# Patient Record
Sex: MALE | Race: BLACK OR AFRICAN AMERICAN | NOT HISPANIC OR LATINO | URBAN - METROPOLITAN AREA
[De-identification: names, ages, dates, MRNs, and addresses within clinical notes are randomized per-mention and may not be internally consistent; named-entity substitution may affect disease eponyms.]

---

## 2017-07-24 ENCOUNTER — EMERGENCY (EMERGENCY)
Facility: HOSPITAL | Age: 57
LOS: 1 days | Discharge: HOME | End: 2017-07-24
Attending: EMERGENCY MEDICINE | Admitting: EMERGENCY MEDICINE
Payer: COMMERCIAL

## 2017-07-24 VITALS
RESPIRATION RATE: 18 BRPM | TEMPERATURE: 98 F | HEART RATE: 67 BPM | OXYGEN SATURATION: 99 % | DIASTOLIC BLOOD PRESSURE: 93 MMHG | SYSTOLIC BLOOD PRESSURE: 132 MMHG

## 2017-07-24 VITALS
SYSTOLIC BLOOD PRESSURE: 109 MMHG | RESPIRATION RATE: 16 BRPM | DIASTOLIC BLOOD PRESSURE: 71 MMHG | HEART RATE: 52 BPM | TEMPERATURE: 98 F | OXYGEN SATURATION: 99 %

## 2017-07-24 LAB
ALBUMIN SERPL ELPH-MCNC: 4.7 G/DL — SIGNIFICANT CHANGE UP (ref 3.3–5)
ALP SERPL-CCNC: 61 U/L — SIGNIFICANT CHANGE UP (ref 40–120)
ALT FLD-CCNC: 15 U/L RC — SIGNIFICANT CHANGE UP (ref 10–45)
ANION GAP SERPL CALC-SCNC: 13 MMOL/L — SIGNIFICANT CHANGE UP (ref 5–17)
APPEARANCE UR: CLEAR — SIGNIFICANT CHANGE UP
APTT BLD: 30.4 SEC — SIGNIFICANT CHANGE UP (ref 27.5–37.4)
AST SERPL-CCNC: 21 U/L — SIGNIFICANT CHANGE UP (ref 10–40)
BASE EXCESS BLDV CALC-SCNC: 1.4 MMOL/L — SIGNIFICANT CHANGE UP (ref -2–2)
BASOPHILS # BLD AUTO: 0.1 K/UL — SIGNIFICANT CHANGE UP (ref 0–0.2)
BASOPHILS NFR BLD AUTO: 2.1 % — HIGH (ref 0–2)
BILIRUB SERPL-MCNC: 0.7 MG/DL — SIGNIFICANT CHANGE UP (ref 0.2–1.2)
BILIRUB UR-MCNC: NEGATIVE — SIGNIFICANT CHANGE UP
BUN SERPL-MCNC: 17 MG/DL — SIGNIFICANT CHANGE UP (ref 7–23)
CA-I SERPL-SCNC: 1.31 MMOL/L — HIGH (ref 1.12–1.3)
CALCIUM SERPL-MCNC: 9.6 MG/DL — SIGNIFICANT CHANGE UP (ref 8.4–10.5)
CHLORIDE BLDV-SCNC: 104 MMOL/L — SIGNIFICANT CHANGE UP (ref 96–108)
CHLORIDE SERPL-SCNC: 103 MMOL/L — SIGNIFICANT CHANGE UP (ref 96–108)
CO2 BLDV-SCNC: 30 MMOL/L — SIGNIFICANT CHANGE UP (ref 22–30)
CO2 SERPL-SCNC: 25 MMOL/L — SIGNIFICANT CHANGE UP (ref 22–31)
COLOR SPEC: SIGNIFICANT CHANGE UP
CREAT SERPL-MCNC: 1.2 MG/DL — SIGNIFICANT CHANGE UP (ref 0.5–1.3)
DIFF PNL FLD: NEGATIVE — SIGNIFICANT CHANGE UP
EOSINOPHIL # BLD AUTO: 0.2 K/UL — SIGNIFICANT CHANGE UP (ref 0–0.5)
EOSINOPHIL NFR BLD AUTO: 2.7 % — SIGNIFICANT CHANGE UP (ref 0–6)
GAS PNL BLDV: 140 MMOL/L — SIGNIFICANT CHANGE UP (ref 136–145)
GAS PNL BLDV: SIGNIFICANT CHANGE UP
GAS PNL BLDV: SIGNIFICANT CHANGE UP
GLUCOSE BLDV-MCNC: 100 MG/DL — HIGH (ref 70–99)
GLUCOSE SERPL-MCNC: 103 MG/DL — HIGH (ref 70–99)
GLUCOSE UR QL: NEGATIVE — SIGNIFICANT CHANGE UP
HCO3 BLDV-SCNC: 28 MMOL/L — SIGNIFICANT CHANGE UP (ref 21–29)
HCT VFR BLD CALC: 43.4 % — SIGNIFICANT CHANGE UP (ref 39–50)
HCT VFR BLDA CALC: 44 % — SIGNIFICANT CHANGE UP (ref 39–50)
HGB BLD CALC-MCNC: 14.2 G/DL — SIGNIFICANT CHANGE UP (ref 13–17)
HGB BLD-MCNC: 14.8 G/DL — SIGNIFICANT CHANGE UP (ref 13–17)
KETONES UR-MCNC: NEGATIVE — SIGNIFICANT CHANGE UP
LACTATE BLDV-MCNC: 1.5 MMOL/L — SIGNIFICANT CHANGE UP (ref 0.7–2)
LEUKOCYTE ESTERASE UR-ACNC: NEGATIVE — SIGNIFICANT CHANGE UP
LYMPHOCYTES # BLD AUTO: 2.9 K/UL — SIGNIFICANT CHANGE UP (ref 1–3.3)
LYMPHOCYTES # BLD AUTO: 48.3 % — HIGH (ref 13–44)
MCHC RBC-ENTMCNC: 29.4 PG — SIGNIFICANT CHANGE UP (ref 27–34)
MCHC RBC-ENTMCNC: 34.1 GM/DL — SIGNIFICANT CHANGE UP (ref 32–36)
MCV RBC AUTO: 86.3 FL — SIGNIFICANT CHANGE UP (ref 80–100)
MONOCYTES # BLD AUTO: 0.6 K/UL — SIGNIFICANT CHANGE UP (ref 0–0.9)
MONOCYTES NFR BLD AUTO: 10.6 % — SIGNIFICANT CHANGE UP (ref 2–14)
NEUTROPHILS # BLD AUTO: 2.2 K/UL — SIGNIFICANT CHANGE UP (ref 1.8–7.4)
NEUTROPHILS NFR BLD AUTO: 36.3 % — LOW (ref 43–77)
NITRITE UR-MCNC: NEGATIVE — SIGNIFICANT CHANGE UP
PCO2 BLDV: 57 MMHG — HIGH (ref 35–50)
PH BLDV: 7.32 — LOW (ref 7.35–7.45)
PH UR: 6 — SIGNIFICANT CHANGE UP (ref 5–8)
PLATELET # BLD AUTO: 221 K/UL — SIGNIFICANT CHANGE UP (ref 150–400)
PO2 BLDV: 30 MMHG — SIGNIFICANT CHANGE UP (ref 25–45)
POTASSIUM BLDV-SCNC: 4 MMOL/L — SIGNIFICANT CHANGE UP (ref 3.5–5)
POTASSIUM SERPL-MCNC: 4.2 MMOL/L — SIGNIFICANT CHANGE UP (ref 3.5–5.3)
POTASSIUM SERPL-SCNC: 4.2 MMOL/L — SIGNIFICANT CHANGE UP (ref 3.5–5.3)
PROT SERPL-MCNC: 7.8 G/DL — SIGNIFICANT CHANGE UP (ref 6–8.3)
PROT UR-MCNC: SIGNIFICANT CHANGE UP
RBC # BLD: 5.03 M/UL — SIGNIFICANT CHANGE UP (ref 4.2–5.8)
RBC # FLD: 12.6 % — SIGNIFICANT CHANGE UP (ref 10.3–14.5)
SAO2 % BLDV: 38 % — LOW (ref 67–88)
SODIUM SERPL-SCNC: 141 MMOL/L — SIGNIFICANT CHANGE UP (ref 135–145)
SP GR SPEC: 1.02 — SIGNIFICANT CHANGE UP (ref 1.01–1.02)
UROBILINOGEN FLD QL: NEGATIVE — SIGNIFICANT CHANGE UP
WBC # BLD: 6 K/UL — SIGNIFICANT CHANGE UP (ref 3.8–10.5)
WBC # FLD AUTO: 6 K/UL — SIGNIFICANT CHANGE UP (ref 3.8–10.5)

## 2017-07-24 PROCEDURE — 80053 COMPREHEN METABOLIC PANEL: CPT

## 2017-07-24 PROCEDURE — 74020: CPT | Mod: 26

## 2017-07-24 PROCEDURE — 99285 EMERGENCY DEPT VISIT HI MDM: CPT

## 2017-07-24 PROCEDURE — 81003 URINALYSIS AUTO W/O SCOPE: CPT

## 2017-07-24 PROCEDURE — 99242 OFF/OP CONSLTJ NEW/EST SF 20: CPT

## 2017-07-24 PROCEDURE — 84295 ASSAY OF SERUM SODIUM: CPT

## 2017-07-24 PROCEDURE — 85027 COMPLETE CBC AUTOMATED: CPT

## 2017-07-24 PROCEDURE — 82803 BLOOD GASES ANY COMBINATION: CPT

## 2017-07-24 PROCEDURE — 71010: CPT | Mod: 26

## 2017-07-24 PROCEDURE — 74020: CPT

## 2017-07-24 PROCEDURE — 83605 ASSAY OF LACTIC ACID: CPT

## 2017-07-24 PROCEDURE — 82330 ASSAY OF CALCIUM: CPT

## 2017-07-24 PROCEDURE — 82947 ASSAY GLUCOSE BLOOD QUANT: CPT

## 2017-07-24 PROCEDURE — 85014 HEMATOCRIT: CPT

## 2017-07-24 PROCEDURE — 85730 THROMBOPLASTIN TIME PARTIAL: CPT

## 2017-07-24 PROCEDURE — 74177 CT ABD & PELVIS W/CONTRAST: CPT

## 2017-07-24 PROCEDURE — 99284 EMERGENCY DEPT VISIT MOD MDM: CPT | Mod: 25

## 2017-07-24 PROCEDURE — 71045 X-RAY EXAM CHEST 1 VIEW: CPT

## 2017-07-24 PROCEDURE — 82435 ASSAY OF BLOOD CHLORIDE: CPT

## 2017-07-24 PROCEDURE — 84132 ASSAY OF SERUM POTASSIUM: CPT

## 2017-07-24 PROCEDURE — 74177 CT ABD & PELVIS W/CONTRAST: CPT | Mod: 26

## 2017-07-24 RX ORDER — SODIUM CHLORIDE 9 MG/ML
3 INJECTION INTRAMUSCULAR; INTRAVENOUS; SUBCUTANEOUS ONCE
Qty: 0 | Refills: 0 | Status: COMPLETED | OUTPATIENT
Start: 2017-07-24 | End: 2017-07-24

## 2017-07-24 RX ADMIN — SODIUM CHLORIDE 3 MILLILITER(S): 9 INJECTION INTRAMUSCULAR; INTRAVENOUS; SUBCUTANEOUS at 12:29

## 2017-07-24 NOTE — ED ADULT NURSE NOTE - OBJECTIVE STATEMENT
56 yr old male with dtr and toddler grandson from home with c/o L sided inguinal hernia diagnosed in may 2017, now with worsening pain, (denies hematuria/dysuria/dec appettite/nausea/vomiting/diarrhea), +normal appettite, pain worse with standing, relieved in supine position, has never reduced hernia "its always protruding", at present well appearing vitals stable family present

## 2017-07-24 NOTE — ED PROVIDER NOTE - MEDICAL DECISION MAKING DETAILS
56 year old male with 3 months of left sided inguinal pain. Consistent with hernia. Presents for surgical consult. Plan: Check labs, and consult surgery for eventual repair.

## 2017-07-24 NOTE — ED ADULT NURSE REASSESSMENT NOTE - NS ED NURSE REASSESS COMMENT FT1
Recvd pt awake, alert and responsive to all stimuli.  no sob or respiratory distress noted at this time.  pt denies any pain and is resting comfortably in bed awaiting md dispo.  will continue to monitor.

## 2017-07-24 NOTE — ED PROVIDER NOTE - OBJECTIVE STATEMENT
56 year old male with no pertinent pmhx presents with 3 months hx of inguinal pain. Was diagnosed with inguinal hernia by PMD and was referred to general surgery x1 month ago. Pain has worsened. Exacerbated by walking and standing but improves by lying flat. Currently lying flat on stretcher. No vomiting, fever or chills.

## 2017-07-24 NOTE — ED PROVIDER NOTE - PROGRESS NOTE DETAILS
Endorsed To Dr MARK Billings MD, Facep MARK Scott MD : pt reassessed by me, pain free. ct neg for intraabd pathology. will dc w gen surg f/u for further eval and poss surg for hernia. additional verbal instructions regarding diagnosis, return precautions and follow up plan given to pt and/or family. MARK Scott MD

## 2017-07-24 NOTE — CONSULT NOTE ADULT - ATTENDING COMMENTS
I have seen and examined this patient and agree with above. 56 year old male presents with left inguinal pain that has progressively become worse. He is afebrile and laboratory findings are within normal limits. CT scan demonstrates no inguinal hernia or intraabdominal pathology.  It is recommended he undergo a colonoscopy and if recurrent pain exists to follow up in the office.

## 2017-07-24 NOTE — CONSULT NOTE ADULT - ASSESSMENT
56 year old man with symptomatic left inguinal hernia and right inguinal hernia appreciated on exam  Plan:  -CT abdomen and pelvis to evaluate for bilateral inguinal hernias while patient is in ED  -outpatient colonoscopy  -Medical clearance from Dr. Fermin (Surgical team spoke to Dr. Fermin at [phone])  -outpatient operative intervention, Dr. Lezama or Dr. Allred pending CT scan results  -pt seen and d/w Dr. Lezama  #[id number] 56 year old man with symptomatic left inguinal hernia and right inguinal hernia appreciated on exam  Plan:  -outpatient colonoscopy  -Medical clearance from Dr. Fermin (Surgical team spoke to Dr. Fermin at [phone])  -f/u Dr. Haylee Lezama within 3 weeks. Patient should get colonoscopy done prior to making appointment  -pt seen and d/w Dr. Lezama  #[id number]

## 2017-07-24 NOTE — CONSULT NOTE ADULT - SUBJECTIVE AND OBJECTIVE BOX
56 year old man presents with left inguinal hernia that has been there since May 2017. Recently, the pain has gotten much worse and he came to the ED due to inability to get an appointment with the surgeon prior to August. The pain and groin swelling is most severe when he is standing and working and it goes away completely when he lies down. He does not describe any nausea, vomiting, fevers, chills or overlying skin changes. Patient does not complain of any right sided symptoms    PMH: no significant PMH, patient has never had a colonoscopy  PSH: none  Meds: none  Allergies: penicillin    T(C): 36.7 (17 @ 11:57), Max: 36.7 (17 @ 11:57)  HR: 72 (17 @ 11:57) (67 - 72)  BP: 124/74 (17 @ 11:57) (124/74 - 132/93)  RR: 17 (17 @ 11:57) (17 - 18)  SpO2: 99% (17 @ 11:57) (99% - 99%)  Wt(kg): --    General: alert and oriented pleasant man, NAD  Resp: airway patent, respirations unlabored  CVS: regular rate and rhythm  Abdomen: soft, nontender, nondistended, no scars  Groin: left inguinal hernia palpable when standing and coughing, spontaneously reduces when lying down, right inguinal hernia also palpable on standing and coughing  Extremities: no edema  Skin: warm, dry, appropriate color                          14.8   6.0   )-----------( 221      ( 2017 11:37 )             43.4     2017 11:37    141    |  103    |  17     ----------------------------<  103    4.2     |  25     |  1.20     Ca    9.6        2017 11:37    TPro  7.8    /  Alb  4.7    /  TBili  0.7    /  DBili  x      /  AST  21     /  ALT  15     /  AlkPhos  61     2017 11:37    LIVER FUNCTIONS - ( 2017 11:37 )  Alb: 4.7 g/dL / Pro: 7.8 g/dL / ALK PHOS: 61 U/L / ALT: 15 U/L RC / AST: 21 U/L / GGT: x           PTT - ( 2017 11:37 )  PTT:30.4 sec  CAPILLARY BLOOD GLUCOSE            Urinalysis Basic - ( 2017 11:58 )    Color: x / Appearance: Clear / S.019 / pH: x  Gluc: x / Ketone: Negative  / Bili: Negative / Urobili: Negative   Blood: x / Protein: Trace / Nitrite: Negative   Leuk Esterase: Negative / RBC: x / WBC x   Sq Epi: x / Non Sq Epi: x / Bacteria: x  CXR: The heart is normal in size. The lungs are clear. The visualized bones are  normal. 56 year old man presents with left inguinal hernia that has been there since May 2017. Recently, the pain has gotten much worse and he came to the ED due to inability to get an appointment with the surgeon prior to August. The pain and groin swelling is most severe when he is standing and working and it goes away completely when he lies down. He does not describe any nausea, vomiting, fevers, chills or overlying skin changes. Patient does not complain of any right sided symptoms    PMH: no significant PMH, patient has never had a colonoscopy  PSH: none  Meds: none  Allergies: penicillin    T(C): 36.7 (17 @ 11:57), Max: 36.7 (17 @ 11:57)  HR: 72 (17 @ 11:57) (67 - 72)  BP: 124/74 (17 @ 11:57) (124/74 - 132/93)  RR: 17 (17 @ 11:57) (17 - 18)  SpO2: 99% (17 @ 11:57) (99% - 99%)  Wt(kg): --    General: alert and oriented pleasant man, NAD  Resp: airway patent, respirations unlabored  CVS: regular rate and rhythm  Abdomen: soft, nontender, nondistended, no scars  Groin: left inguinal hernia palpable when standing and coughing, spontaneously reduces when lying down, right inguinal hernia also palpable on standing and coughing  Extremities: no edema  Skin: warm, dry, appropriate color                          14.8   6.0   )-----------( 221      ( 2017 11:37 )             43.4     2017 11:37    141    |  103    |  17     ----------------------------<  103    4.2     |  25     |  1.20     Ca    9.6        2017 11:37    TPro  7.8    /  Alb  4.7    /  TBili  0.7    /  DBili  x      /  AST  21     /  ALT  15     /  AlkPhos  61     2017 11:37    LIVER FUNCTIONS - ( 2017 11:37 )  Alb: 4.7 g/dL / Pro: 7.8 g/dL / ALK PHOS: 61 U/L / ALT: 15 U/L RC / AST: 21 U/L / GGT: x           PTT - ( 2017 11:37 )  PTT:30.4 sec  CAPILLARY BLOOD GLUCOSE            Urinalysis Basic - ( 2017 11:58 )    Color: x / Appearance: Clear / S.019 / pH: x  Gluc: x / Ketone: Negative  / Bili: Negative / Urobili: Negative   Blood: x / Protein: Trace / Nitrite: Negative   Leuk Esterase: Negative / RBC: x / WBC x   Sq Epi: x / Non Sq Epi: x / Bacteria: x  CXR: The heart is normal in size. The lungs are clear. The visualized bones are  normal.  CT Abd/Pelvis: No inguinal hernia. No acute intra-abdominal pathology.

## 2017-08-18 ENCOUNTER — OUTPATIENT (OUTPATIENT)
Dept: OUTPATIENT SERVICES | Facility: HOSPITAL | Age: 57
LOS: 1 days | End: 2017-08-18

## 2017-08-18 VITALS
TEMPERATURE: 97 F | RESPIRATION RATE: 14 BRPM | DIASTOLIC BLOOD PRESSURE: 80 MMHG | HEART RATE: 67 BPM | OXYGEN SATURATION: 100 % | WEIGHT: 166.01 LBS | HEIGHT: 69.5 IN | SYSTOLIC BLOOD PRESSURE: 110 MMHG

## 2017-08-18 DIAGNOSIS — K40.30 UNILATERAL INGUINAL HERNIA, WITH OBSTRUCTION, WITHOUT GANGRENE, NOT SPECIFIED AS RECURRENT: ICD-10-CM

## 2017-08-18 DIAGNOSIS — K40.90 UNILATERAL INGUINAL HERNIA, WITHOUT OBSTRUCTION OR GANGRENE, NOT SPECIFIED AS RECURRENT: ICD-10-CM

## 2017-08-18 NOTE — H&P PST ADULT - LYMPHATIC
anterior cervical L/supraclavicular L/supraclavicular R/posterior cervical R/posterior cervical L/anterior cervical R

## 2017-08-18 NOTE — H&P PST ADULT - PROBLEM SELECTOR PLAN 1
This is a 57 y/o male who is scheduled for left inguinal hernia repair on 8-31-17  * Given pre op famotidine  * Given scrub cleanser

## 2017-08-18 NOTE — H&P PST ADULT - NSANTHOSAYNRD_GEN_A_CORE
No. HESHAM screening performed.  STOP BANG Legend: 0-2 = LOW Risk; 3-4 = INTERMEDIATE Risk; 5-8 = HIGH Risk

## 2017-08-18 NOTE — H&P PST ADULT - LAB RESULTS AND INTERPRETATION
bmp and cbc from 7-24-17 from [facility] when patient in the ER for left inguinal pain--copy on chart

## 2017-08-31 ENCOUNTER — OUTPATIENT (OUTPATIENT)
Dept: OUTPATIENT SERVICES | Facility: HOSPITAL | Age: 57
LOS: 1 days | Discharge: HOME | End: 2017-08-31

## 2017-08-31 VITALS
HEIGHT: 69.5 IN | RESPIRATION RATE: 18 BRPM | WEIGHT: 166.01 LBS | HEART RATE: 70 BPM | OXYGEN SATURATION: 100 % | DIASTOLIC BLOOD PRESSURE: 90 MMHG | TEMPERATURE: 38 F | SYSTOLIC BLOOD PRESSURE: 123 MMHG

## 2017-08-31 VITALS
TEMPERATURE: 98 F | SYSTOLIC BLOOD PRESSURE: 106 MMHG | DIASTOLIC BLOOD PRESSURE: 74 MMHG | RESPIRATION RATE: 15 BRPM | OXYGEN SATURATION: 95 % | HEART RATE: 52 BPM

## 2017-08-31 DIAGNOSIS — K40.30 UNILATERAL INGUINAL HERNIA, WITH OBSTRUCTION, WITHOUT GANGRENE, NOT SPECIFIED AS RECURRENT: ICD-10-CM

## 2017-08-31 NOTE — BRIEF OPERATIVE NOTE - POST-OP DX
Left inguinal hernia  08/31/2017    Active  Josh Hall  Left inguinal hernia  08/31/2017    Active  Osvaldo Trejo
Left inguinal hernia  08/31/2017    Active  Josh Hall

## 2017-08-31 NOTE — ASU DISCHARGE PLAN (ADULT/PEDIATRIC). - ACTIVITY LEVEL
no sports/gym/no exercise/no Cycling for at least 3 weeks/no heavy lifting no heavy lifting/no sports/gym/no Cycling for at least 3 weeks; no heavy lifting for 4-6 weeks/no exercise

## 2017-08-31 NOTE — ASU DISCHARGE PLAN (ADULT/PEDIATRIC). - SPECIAL INSTRUCTIONS
wear supportive underwear   Apply ice for 20 minutes several times a day for the next 24-48 hours, then as needed for comfort ( for swelling )     leave steri-strips intact

## 2017-08-31 NOTE — ASU DISCHARGE PLAN (ADULT/PEDIATRIC). - DISCHARGE DATE
31-Aug-2017 Pt calls for refill of Percocet.  Last office visit 4/11/17, last fill 4/3/17 #90 with no refills.  Cannot fill without MD approval.  Please advise        Patient will come to [facility]  office to : prescription.   Patient was advised of location and hours: Yes.   Patient was advised to bring photo ID: Yes.   Patient elects another party to  item: yes, name: Elsie, granddaughter.          31-Aug-2017 16:38

## 2017-08-31 NOTE — ASU DISCHARGE PLAN (ADULT/PEDIATRIC). - NOTIFY
Unable to Urinate/Pain not relieved by Medications/Bleeding that does not stop/Numbness, tingling/Numbness, color, or temperature change to extremity/Excessive Diarrhea/Increased Irritability or Sluggishness/Fever greater than 101/Swelling that continues/Persistent Nausea and Vomiting/Inability to Tolerate Liquids or Foods

## 2017-08-31 NOTE — BRIEF OPERATIVE NOTE - PROCEDURE
Inguinal hernia repair, left  08/31/2017    Active  NICCI
Left inguinal hernia repair  08/31/2017  with mesh  Active  DLOMASNEY

## 2017-08-31 NOTE — ASU DISCHARGE PLAN (ADULT/PEDIATRIC). - MEDICATION SUMMARY - MEDICATIONS TO TAKE
I will START or STAY ON the medications listed below when I get home from the hospital:    Percocet 5/325 325 mg-5 mg oral tablet  -- 1 - 2 tab(s) by mouth every 4 hours, As Needed -for moderate pain -for severe pain MDD:8 tabs  -- Caution federal law prohibits the transfer of this drug to any person other  than the person for whom it was prescribed.  May cause drowsiness.  Alcohol may intensify this effect.  Use care when operating dangerous machinery.  This prescription cannot be refilled.  This product contains acetaminophen.  Do not use  with any other product containing acetaminophen to prevent possible liver damage.  Using more of this medication than prescribed may cause serious breathing problems.    -- Indication: For post op pain med    multivitamin  -- 1 tab(s) by mouth , As Needed. Last dose 8/24/17  -- Indication: For home med

## 2017-08-31 NOTE — BRIEF OPERATIVE NOTE - OPERATION/FINDINGS
left inguinal hernia repair with Perfix plug
direct left inguinal hernia repaired with large marlex mesh plug

## 2017-11-30 NOTE — H&P PST ADULT - HISTORY OF PRESENT ILLNESS
This is a 57 y/o male who presents with progressively worsening symptomatology of left groin hernia. Pain increased with patient visiting ER in July 2017. Evaluated by surgeon who recommended intervention. Scheduled for left inguinal hernia repair on 8-31-17
Improved.

## 2020-03-20 NOTE — ASU DISCHARGE PLAN (ADULT/PEDIATRIC). - PATIENT BELONGING
Pt is a 72 yo male with the co decreased cho cath output and worsening erythema and edema of the groin. Pt denies any cough fever or chills no N/V?D no CP or SOB. Pt denies any recent sick contacts. patient's belongings returned Pt is a 72 yo male with the co decreased cho cath output and worsening erythema and edema of the groin. Pt denies any cough fever or chills no N/V/D no CP or SOB. Pt states that he has noticed increased redness and swelling to his testicles for the past four days, pt reports increased pain with redness and swelling. Pt states his cho cath was changed 1 week ago with no issues, he has a cho cath for urinary retention due to BPH. Pt denies any recent sick contacts.

## 2022-04-21 NOTE — H&P PST ADULT - PT NEEDS ASSIST
Signed form was faxed to [employer] dept and faxed back to the forms completion dept.    no

## 2022-07-26 NOTE — H&P PST ADULT - MEDICATION ADMINISTRATION INFO, PROFILE
no concerns Clindamycin Counseling: I counseled the patient regarding use of clindamycin as an antibiotic for prophylactic and/or therapeutic purposes. Clindamycin is active against numerous classes of bacteria, including skin bacteria. Side effects may include nausea, diarrhea, gastrointestinal upset, rash, hives, yeast infections, and in rare cases, colitis.

## 2022-10-06 NOTE — H&P PST ADULT - NS PRO ABUSE SCREEN AFRAID ANYONE YN
Can we fax her prescription for symbicort?   
From: Emma Fletcher  To: Aura Pineda  Sent: 10/5/2022 8:39 PM CDT  Subject: SYMBICORT - [facility] lost your previous FAX    Dr. Pineda,  [facility] had changed their FAX # & they lost the prescription you faxed to them 2 months ago.  They told us that they need a new prescription for SYMBICORT. Also they said that the FAX/COVER PAGE [or in the prescription itself if it allows] should also state the following:  FOR PATIENT ID = [id number]  CASE # = [id number]  Their New FAX# is = [phone]  Looks like they have issues w/their process, & they suggested that we get their \"CONFIRMATION #\" that prints back when your FAX completes processing. Maybe we can get this \"CONFIRMATION #\" from your nurse in their reply back to us in the JEB here, &/or when/if they want to call us, as they sometimes do.    THANK YOU VERY MUCH  Emma Fletcher  [phone]  
no

## 2024-09-30 NOTE — H&P PST ADULT - NEGATIVE PSYCHIATRIC SYMPTOMS
[Normal] : soft, non-tender, non-distended, no masses palpated, no HSM and normal bowel sounds [Normal] : soft, non-tender, non-distended, no masses palpated, no HSM and normal bowel sounds glasses no suicidal ideation/no anxiety/no depression